# Patient Record
Sex: MALE | Race: WHITE | NOT HISPANIC OR LATINO | Employment: FULL TIME | URBAN - METROPOLITAN AREA
[De-identification: names, ages, dates, MRNs, and addresses within clinical notes are randomized per-mention and may not be internally consistent; named-entity substitution may affect disease eponyms.]

---

## 2023-04-18 LAB
GRAM STAIN: NORMAL
TISSUE/WOUND CULTURE/SMEAR: NORMAL

## 2023-04-27 LAB
THYROTROPIN (MIU/L) IN SER/PLAS BY DETECTION LIMIT <= 0.05 MIU/L: 0.51 MIU/L (ref 0.44–3.98)
THYROXINE (T4) FREE (NG/DL) IN SER/PLAS: 1.04 NG/DL (ref 0.61–1.12)

## 2023-04-28 LAB — TRIIODOTHYRONINE (T3) FREE (PG/ML) IN SER/PLAS: 3.9 PG/ML (ref 2.3–4.2)

## 2023-05-01 LAB — TSH RECEPTOR ANTIBODY: <0.8 IU/L

## 2023-05-02 LAB — THYROID STIMULATING IMMUNOGLOBULIN: <1 TSI INDEX

## 2023-05-10 DIAGNOSIS — I10 HTN (HYPERTENSION), BENIGN: ICD-10-CM

## 2023-05-10 DIAGNOSIS — E78.00 PURE HYPERCHOLESTEROLEMIA: Primary | ICD-10-CM

## 2023-05-10 RX ORDER — AMLODIPINE BESYLATE 5 MG/1
1 TABLET ORAL DAILY
COMMUNITY
Start: 2022-12-05 | End: 2023-05-10 | Stop reason: SDUPTHER

## 2023-05-10 RX ORDER — AMLODIPINE BESYLATE 5 MG/1
5 TABLET ORAL DAILY
Qty: 90 TABLET | Refills: 1 | Status: SHIPPED | OUTPATIENT
Start: 2023-05-10 | End: 2023-05-19 | Stop reason: SDUPTHER

## 2023-05-10 RX ORDER — ATORVASTATIN CALCIUM 20 MG/1
1 TABLET, FILM COATED ORAL NIGHTLY
COMMUNITY
Start: 2023-01-04 | End: 2023-05-10 | Stop reason: SDUPTHER

## 2023-05-10 RX ORDER — ATORVASTATIN CALCIUM 20 MG/1
20 TABLET, FILM COATED ORAL NIGHTLY
Qty: 90 TABLET | Refills: 1 | Status: SHIPPED | OUTPATIENT
Start: 2023-05-10 | End: 2023-05-19 | Stop reason: SDUPTHER

## 2023-05-19 DIAGNOSIS — E78.00 PURE HYPERCHOLESTEROLEMIA: ICD-10-CM

## 2023-05-19 DIAGNOSIS — I10 HTN (HYPERTENSION), BENIGN: ICD-10-CM

## 2023-05-19 RX ORDER — ATORVASTATIN CALCIUM 20 MG/1
20 TABLET, FILM COATED ORAL NIGHTLY
Qty: 90 TABLET | Refills: 1 | Status: SHIPPED | OUTPATIENT
Start: 2023-05-19

## 2023-05-19 RX ORDER — AMLODIPINE BESYLATE 5 MG/1
5 TABLET ORAL DAILY
Qty: 90 TABLET | Refills: 1 | Status: SHIPPED | OUTPATIENT
Start: 2023-05-19 | End: 2023-11-24

## 2023-05-31 ENCOUNTER — HOSPITAL ENCOUNTER (OUTPATIENT)
Dept: DATA CONVERSION | Facility: HOSPITAL | Age: 58
End: 2023-05-31
Attending: OTOLARYNGOLOGY | Admitting: OTOLARYNGOLOGY

## 2023-05-31 DIAGNOSIS — J32.3 CHRONIC SPHENOIDAL SINUSITIS: ICD-10-CM

## 2023-05-31 DIAGNOSIS — J32.2 CHRONIC ETHMOIDAL SINUSITIS: ICD-10-CM

## 2023-05-31 DIAGNOSIS — J34.2 DEVIATED NASAL SEPTUM: ICD-10-CM

## 2023-05-31 DIAGNOSIS — E78.5 HYPERLIPIDEMIA, UNSPECIFIED: ICD-10-CM

## 2023-05-31 DIAGNOSIS — J32.0 CHRONIC MAXILLARY SINUSITIS: ICD-10-CM

## 2023-05-31 DIAGNOSIS — I10 ESSENTIAL (PRIMARY) HYPERTENSION: ICD-10-CM

## 2023-06-02 ENCOUNTER — APPOINTMENT (OUTPATIENT)
Dept: LAB | Facility: LAB | Age: 58
End: 2023-06-02

## 2023-06-02 LAB
THYROTROPIN (MIU/L) IN SER/PLAS BY DETECTION LIMIT <= 0.05 MIU/L: 0.08 MIU/L (ref 0.44–3.98)
THYROXINE (T4) FREE (NG/DL) IN SER/PLAS: 1.59 NG/DL (ref 0.61–1.12)

## 2023-07-12 LAB
COMPLETE PATHOLOGY REPORT: NORMAL
CONVERTED CLINICAL DIAGNOSIS-HISTORY: NORMAL
CONVERTED FINAL DIAGNOSIS: NORMAL
CONVERTED FINAL REPORT PDF LINK TO COPY AND PASTE: NORMAL
CONVERTED GROSS DESCRIPTION: NORMAL

## 2023-09-07 VITALS
TEMPERATURE: 97.2 F | SYSTOLIC BLOOD PRESSURE: 174 MMHG | RESPIRATION RATE: 16 BRPM | HEART RATE: 64 BPM | DIASTOLIC BLOOD PRESSURE: 94 MMHG

## 2023-09-27 LAB
THYROTROPIN (MIU/L) IN SER/PLAS BY DETECTION LIMIT <= 0.05 MIU/L: 0.2 MIU/L (ref 0.44–3.98)
THYROXINE (T4) FREE (NG/DL) IN SER/PLAS: 1.05 NG/DL (ref 0.61–1.12)

## 2023-09-30 NOTE — H&P
History of Present Illness:   History Present Illness:  Reason for surgery: Chronic sinusitis, deviated nasal  septum   HPI:    This patient is a 58-year-old male who presented to outpatient ENT clinic with continued complaints of nasal obstruction, post-nasal drip, chronic cough, found on  exam to have a deviation of his nasal septum. He was trialed on a course of antibiotics and steroids with little subjective improvement and radiographic evidence of persistent sinonasal disease. Given this, decision was made to proceed to the operating  room for bilateral endoscopic sinus surgery, septoplasty. The risks, benefits, and alternatives were extensively discussed with the patient, who ultimately agreed to proceed. There have been no major changes to his medical status since his previous outpatient  visit. No other relevant history at this time.     Allergies:        Allergies:  ·  No Known Allergies :     Home Medication Review:   Home Medications Reviewed: yes     Impression/Procedure:   ·  Impression and Planned Procedure: Bilateral endoscopic sinus surgery  Septoplasty       ERAS (Enhanced Recovery After Surgery):  ·  ERAS Patient: no       Vital Signs:  Temperature C: 36.2 degrees C   Temperature F: 97.1 degrees F   Heart Rate: 64 beats per minute   Respiratory Rate: 16 breath per minute   Blood Pressure Systolic: 174 mm/Hg   Blood Pressure Diastolic: 94 mm/Hg     Physical Exam by System:    Constitutional: Well developed, awake/alert/oriented  x3, no distress, alert and cooperative   Eyes: Sclera nonicteric.  EOMI   Head/Neck: Neck supple, no apparent injury, No JVD,  trachea midline   Respiratory/Thorax: Good chest expansion, thorax  symmetric.  Breathing unlabored   Cardiovascular: No signs of peripheral edema or cyanosis   Extremities: Moving all extremities spontaneously.   No evidence of clubbing or contracture   Neurological: alert and oriented x3, intact senses,  normal strength   Psychological:  Appropriate mood and behavior   Skin: Warm and dry, no lesions, no rashes     Consent:   COVID-19 Consent:  ·  COVID-19 Risk Consent Surgeon has reviewed key risks related to the risk of lakhwinder COVID-19 and if they contract COVID-19 what the risks are.     Attestation:   Note Completion:  I am a:  Resident/Fellow   Attending Attestation I saw and evaluated the patient.  I personally obtained the key and critical portions of the history and physical exam or was physically present for key and  critical portions performed by the resident/fellow. I reviewed the resident/fellow?s documentation and discussed the patient with the resident/fellow.  I agree with the resident/fellow?s medical decision making as documented in the note.     I personally evaluated the patient on 31-May-2023         Electronic Signatures:  Yuri Conley (MD (Resident))  (Signed 31-May-2023 11:10)   Authored: History of Present Illness, Allergies, Home  Medication Review, Impression/Procedure, ERAS, Physical Exam, Consent, Note Completion  Rocky Townsend)  (Signed 07-Jun-2023 08:20)   Authored: Note Completion   Co-Signer: History of Present Illness, Allergies, Home Medication Review, Impression/Procedure, ERAS, Physical Exam, Consent, Note Completion      Last Updated: 07-Jun-2023 08:20 by Rocky Townsend)

## 2023-10-02 NOTE — OP NOTE
PROCEDURE DETAILS    Preoperative Diagnosis:  Chronic sinusitis  Deviated nasal septum  Postoperative Diagnosis:  Same  Surgeon: Rocky Townsend MD  Resident/Fellow/Other Assistant: Yuri Conley MD    Procedure:  1. Endoscopic maxillary antrostomy with removal of tissue, bilateral  2. Endoscopic complete ethmoidectomy, bilateral  3. Endoscopic frontal sinusotomy, bilateral  4. Endoscopic sphenoidotomy, bilateral  5. Endoscopic septoplasty  6. Use of image guidance, extradural  Anesthesia: GET  Estimated Blood Loss: 100  Findings: 1. Broad septal deviation to the right  2. Thick, inspissated secretions in left anterior ethmoid cells  Specimens(s) Collected: yes,  Bilateral sinus contents   Complications: None  Implants: No absorbable or non-absorbable packing used. No splints.   Patient Returned To/Condition: PACU/satisfactory                                Attestation:   Note Completion:  Attending Attestation I was present for key portions of the procedure and the procedure lasted longer than 5 minutes.    I am a: Resident/Fellow         Electronic Signatures:  Yuri Conley (Resident))  (Signed 31-May-2023 15:25)   Authored: Post-Operative Note, Chart Review, Note Completion  Rocky Townsend)  (Signed 07-Jun-2023 08:24)   Authored: Post-Operative Note, Chart Review, Note Completion   Co-Signer: Post-Operative Note, Chart Review, Note Completion      Last Updated: 07-Jun-2023 08:24 by Rocky Townsend)

## 2024-04-19 NOTE — OP NOTE
PREOPERATIVE DIAGNOSIS:  1. Chronic sinusitis.  2. Postnasal drainage.  3. Throat clearing, coughing.  4. Deviated nasal septum.    POSTOPERATIVE DIAGNOSIS:  1. Chronic sinusitis.  2. Postnasal drainage.  3. Throat clearing, coughing.  4. Deviated nasal septum.    OPERATION/PROCEDURE:  1. Bilateral nasal endoscopy with total ethmoidectomy including  sphenoidotomy with tissue removal, CPT 97286-64.   2. Bilateral nasal endoscopy with frontal sinusotomy, CPT 52228-75.  3. Bilateral maxillary endoscopy with tissue removal, CPT 35965-58.  4. Endoscopic septoplasty, CPT 95936.  5. Extracranial CT image guidance, CPT 37339.    SURGEON:  Rocky Townsend MD.    ASSISTANT(S):  Yuri Conley.    ANESTHESIA:  General endotracheal anesthesia.    COMPLICATIONS:  None.    ESTIMATED BLOOD LOSS:  Approximately 100 cc.    SPECIMENS:  1. Sinus contents.  2. Septum.    FINDINGS:  1. Septal deviation to the right, corrected.  2. Trapped secretions, most prominent within left ethmoid cavity.  3. Diffuse inflammatory changes throughout dissected paranasal  sinuses.   4. No permanent packing was placed.    INDICATIONS FOR PROCEDURE:  The patient is a 58-year-old male who I initially met on April 15,  2023, with ongoing symptoms related to his nose and sinuses.  He was  treated with adequate medical therapy with persistence of symptoms,  and imaging was obtained demonstrating significant inflammatory  changes throughout his sinuses.  He and I discussed options, both  medical and surgical, and he was comfortable moving forward with  endonasal surgery.     DESCRIPTION OF PROCEDURE:  After informed consent was obtained and all questions were answered,  the patient was brought to the operating room and placed supine on  the operating room table.  General anesthesia was induced by the  Anesthesia staff, and the patient was orally intubated.  The table  was turned 90 degrees, and Afrin-soaked pledgets were placed within  both the  patient's nasal cavities.     The CT image guidance system was brought into the field.  The CT data  was uploaded, reviewed, and a plan was finalized.  The headset was  attached to the patient.  The image guidance was registered accurate  in 3 separate orientations and was used throughout the surgical  procedure to identify critical landmarks such as the borders of the  orbit as well as the ethmoid skull base.  The patient was then  prepped and draped in a standard fashion for endonasal surgery.     Endoscopic septoplasty was performed.  1% lidocaine with 1:100,000  epinephrine was injected into both sides of the septum as well as the  axilla of each middle turbinate.  After a period of approximately 10  minutes, a Oxford incision was made on the right-hand side sparing  approximately 15 mm of caudal strut.  Submucoperichondrial and  submucoperiosteal planes were developed, and the deviated segments of  cartilage and bone were widely exposed.  The septum was traversed  anteriorly.  In likewise fashion, submucoperichondrial and  submucoperiosteal planes were developed.  The deviated segments of  cartilage and bone were then removed, and the septum became  significantly more midline.  The Tucker incision was closed with 4-0  chromic.     Bilateral maxillary endoscopy with tissue removal was performed.  Bilaterally, each middle turbinate was medialized, and the uncinate  process was identified and resected.  Each antrostomy was opened from  the lacrimal bone anteriorly to the posterior wall of each maxillary  sinus posteriorly, and the natural drainage pathway on each side was  incorporated.  Within each sinus, there was diffuse inflammatory  change that was debrided in all orientations.  Copious irrigation was  applied.     Bilateral nasal endoscopy with total ethmoidectomy including  sphenoidotomy with tissue removal was performed.  Bilaterally,  anterior and posterior ethmoid air cells adjacent to the lamina  and  along the ethmoid skull base were widely removed.  Within the left  ethmoid cavity, there was thick mucoid drainage that was evacuated  with suction and copious irrigation.  At the conclusion of each  ethmoid cavity dissection, the lamina and ethmoid skull base had been  widely delineated.  The anterior nor posterior ethmoid arteries were  encountered.  The basal lamella was traversed, and the superior  turbinate was identified and the inferior 1/3rd was resected.  The  natural drainage pathway of the sphenoid was identified and  cannulated, and each sphenoidotomy was opened widely in all  orientations.  He had a very hypoplastic left sphenoid, and to keep  it patent, I integrated this with the right sphenoid to create 1  large opening.  Edematous mucosa was debrided from each sphenoid.     Bilateral nasal endoscopy with frontal sinusotomy was performed.  With identification of the cranial base posteriorly, I proceeded  anteriorly.  Remnant anterior ethmoid air cells within each frontal  drainage pathway were identified and removed, and each frontal sinus  was cannulated and opened widely in all orientations.  Each frontal  was hypoplastic, but the left was smaller than the right.  At the  conclusion of the frontal sinus dissection, each frontal have been  opened from the axilla of middle turbinate medially to the orbit  laterally and from the anterior to posterior tables.  Edematous  mucosa was debrided from the each frontal drainage pathway.     Hemostasis was deemed excellent.  The patient was turned over to the  Anesthesia staff and extubated in the operating room without  complication.  He was transported to the postanesthesia care unit in  satisfactory condition.     I attest, Dr. Rokcy Townsend was present for all key portions of  the surgical procedure and immediately available for all non-key  portions.  There were no intraoperative complications noted.        Rocky Townsend MD    DD:   06/07/2023 12:22:26 EST  DT:  06/07/2023 13:03:57 EST  DICTATION NUMBER:  [id number]  INTERNAL JOB NUMBER:  [id number]    CC:  Rocky Townsend MD, Fax: [phone]       Electronic Signatures:  Rocky Townsend (MD) (Signed on 10-Sammy-2023 11:08)   Authored   Unsigned, Draft (SYS GENERATED) (Entered on 07-Jun-2023 13:03)   Entered     Last Updated: 10-Sammy-2023 11:08 by Rocky Townsend)

## 2024-06-04 DIAGNOSIS — I10 HTN (HYPERTENSION), BENIGN: ICD-10-CM

## 2024-06-05 RX ORDER — AMLODIPINE BESYLATE 5 MG/1
5 TABLET ORAL DAILY
Qty: 30 TABLET | Refills: 2 | OUTPATIENT
Start: 2024-06-05

## 2024-07-08 DIAGNOSIS — I10 HTN (HYPERTENSION), BENIGN: ICD-10-CM

## 2024-07-10 RX ORDER — AMLODIPINE BESYLATE 5 MG/1
5 TABLET ORAL DAILY
Qty: 30 TABLET | Refills: 0 | OUTPATIENT
Start: 2024-07-10

## 2024-07-11 DIAGNOSIS — I10 HTN (HYPERTENSION), BENIGN: ICD-10-CM

## 2024-07-12 RX ORDER — AMLODIPINE BESYLATE 5 MG/1
5 TABLET ORAL DAILY
Qty: 30 TABLET | Refills: 0 | OUTPATIENT
Start: 2024-07-12

## 2024-10-09 ENCOUNTER — APPOINTMENT (OUTPATIENT)
Dept: PRIMARY CARE | Facility: CLINIC | Age: 59
End: 2024-10-09
Payer: COMMERCIAL

## 2025-01-13 ASSESSMENT — PROMIS GLOBAL HEALTH SCALE
EMOTIONAL_PROBLEMS: NEVER
CARRYOUT_PHYSICAL_ACTIVITIES: COMPLETELY
RATE_AVERAGE_FATIGUE: MILD
RATE_SOCIAL_SATISFACTION: EXCELLENT
RATE_GENERAL_HEALTH: VERY GOOD
RATE_PHYSICAL_HEALTH: VERY GOOD
RATE_AVERAGE_PAIN: 1
CARRYOUT_SOCIAL_ACTIVITIES: EXCELLENT
RATE_MENTAL_HEALTH: EXCELLENT
RATE_QUALITY_OF_LIFE: VERY GOOD

## 2025-01-13 NOTE — PROGRESS NOTES
"Subjective   Patient ID: Miguel Angel Pisano \"Mumtaz\" is a 59 y.o. male who presents for Annual Exam.    Last Annual Physical:January 2023  Last Dental Visit: 2024  Last Eye exam: 8 months ago  Hearing Concerns: No  Diet: well balanced  Exercise Routine: Regularly      HPI     The patient was last seen over 2 years ago. He is not taking any medication at this time. He was previously prescribed Amlodipine for his hypertension and Atorvastatin for hyperlipidemia. His blood today is elevated as he is not taking his medication.     Review of Systems  Constitutional: No fever or chills, No Night Sweats  Eyes: No Blurry Vision or Eye sight problems  ENT: No Nasal Discharge, Hoarseness, sore throat  Cardiovascular: no chest pain, no palpitations and no syncope.   Respiratory: no cough, no shortness of breath during exertion and no shortness of breath at rest.   Gastrointestinal: no abdominal pain, no nausea and no vomiting.   : No issues with urinary stream, burning with urination, no blood in urine or stools  Skin: No Skin rashes or Lesions  Neuro: No Headache, no dizziness or Numbness or tingling  Psych: No Anxiety, depression or sleeping problems  Heme: No Easy bleeding or brusing.     Objective   /90   Pulse 55   Ht 1.803 m (5' 11\")   Wt 78 kg (172 lb)   SpO2 95%   BMI 23.99 kg/m²     Physical Exam  Constitutional: Alert and in no acute distress. Well developed, well nourished.   Head and Face: Head and face: Normal.    Eyes: Normal external exam. Pupils were equal in size, round, reactive to light (PERRL) with normal accommodation and extraocular movements intact (EOMI).   Ears, Nose, Mouth, and Throat: External inspection of ears and nose: Normal.  Hearing: Normal.  Nasal mucosa, septum, and turbinates: Normal.  Lips, teeth, and gums: Normal.  Oropharynx: Normal.   Neck: No neck mass was observed. Supple. Thyroid not enlarged and there were no palpable thyroid nodules.   Cardiovascular: Heart rate and rhythm were " normal, normal S1 and S2. Pedal pulses: Normal. No peripheral edema.   Pulmonary: No respiratory distress. Clear bilateral breath sounds.   Abdomen: Soft nontender; no abdominal mass palpated. Normal bowel sounds. No organomegaly.   : Deferred  Musculoskeletal: No joint swelling seen, normal movements of all extremities. Range of motion: Normal.  Muscle strength/tone: Normal.    Skin: Normal skin color and pigmentation, normal skin turgor, and no rash.   Neurologic: Deep tendon reflexes were 2+ and symmetric.   Psychiatric: Judgment and insight: Intact. Mood and affect: Normal.  Lymphatic: No cervical lymphadenopathy. Palpation of lymph nodes in axillae: Normal.  Palpation of lymph nodes in groin: Normal.    Lab Results   Component Value Date    WBC 9.2 12/19/2022    HGB 14.2 12/19/2022    HCT 44.9 12/19/2022     12/19/2022    CHOL 197 12/19/2022    TRIG 54 12/19/2022    HDL 74.7 12/19/2022    ALT 41 12/19/2022    AST 21 12/19/2022     12/19/2022    K 3.8 12/19/2022     12/19/2022    CREATININE 0.85 12/19/2022    BUN 23 12/19/2022    CO2 33 (H) 12/19/2022    TSH 0.20 (L) 09/27/2023       XR knee  Narrative: Interpreted By:  EFRAIN ALBERTO MD  MRN: [id number]  Patient Name: EFRAIN STEWART     STUDY:  KNEE; 3 VIEWS; KNEE; 1 OR 2 VIEWS     INDICATION:  pain  M25.561: Bilateral knee pain M25.562:.     COMPARISON:  None     ACCESSION NUMBER(S):  [id number]; [id number]     ORDERING CLINICIAN:  ODILON QUEZADA     FINDINGS:  Moderate tricompartmental osteoarthritis left knee worst medially. No  fracture seen. No osseous lesions. Right knee demonstrates no  osseous, articular, or soft tissue abnormality.     Impression: Moderate osteoarthritis left knee worst in the medial compartment.     Normal appearance right knee.  XR knee  Narrative: Interpreted By:  EFRAIN ALBERTO MD  MRN: [id number]  Patient Name: TERRY EFRAIN     STUDY:  KNEE; 3 VIEWS; KNEE; 1 OR 2 VIEWS     INDICATION:  pain  M25.561: Bilateral knee  pain M25.562:.     COMPARISON:  None     ACCESSION NUMBER(S):  [id number]; [id number]     ORDERING CLINICIAN:  ODILON QUEZADA     FINDINGS:  Moderate tricompartmental osteoarthritis left knee worst medially. No  fracture seen. No osseous lesions. Right knee demonstrates no  osseous, articular, or soft tissue abnormality.     Impression: Moderate osteoarthritis left knee worst in the medial compartment.     Normal appearance right knee.      Assessment/Plan   Diagnoses and all orders for this visit:  Annual physical exam  -     CBC; Future  -     Comprehensive Metabolic Panel; Future  -     Hemoglobin A1C; Future  -     Lipid Panel; Future  -     TSH with reflex to Free T4 if abnormal; Future  Benign essential HTN  -     Albumin-Creatinine Ratio, Urine Random; Future  -     amLODIPine (Norvasc) 5 mg tablet; Take 1 tablet (5 mg) by mouth once daily.  -     Follow Up In [facility] - PCP - Established; Future  Agatston CAC score, <100  -     atorvastatin (Lipitor) 20 mg tablet; Take 1 tablet (20 mg) by mouth once daily at bedtime.  Pure hypercholesterolemia  -     Lipoprotein a; Future  -     atorvastatin (Lipitor) 20 mg tablet; Take 1 tablet (20 mg) by mouth once daily at bedtime.  Encounter for immunization  -     Tdap vaccine, age 7 years and older  (BOOSTRIX)  -     Zoster vaccine, recombinant, adult (SHINGRIX)  -     Flu vaccine, trivalent, preservative free, age 6 months and greater (Fluarix/Fluzone/Flulaval)  Vitamin D deficiency  -     Vitamin B12; Future  -     Vitamin D 25-Hydroxy,Total (for eval of Vitamin D levels); Future  Encounter for prostate cancer screening  -     Prostate Specific Antigen, Screen; Future  Non-seasonal allergic rhinitis due to other allergic trigger  -     ipratropium (Atrovent) 21 mcg (0.03 %) nasal spray; Administer 2 sprays into each nostril every 12 hours.        Dear Miguel Angel Pisano     It was my pleasure to take care of you today in the office. Below are the things we  discussed today:     1. Immunizations: Yearly Flu shot is recommended. Get it at the pharmacy         a: COVID:  Get it at the pharmacy         b: Tetanus: Given today         c: Shingrix: Given today.         2. Blood Work: ordered  3. Seen your dentist twice a year  4. Yearly Eye exam is recommended    5. BMI: Normal  6: Diet recommendations:   Eat Clean, Try to have as many home cooked meals as possible  Avoid processed foods which contain excess calories, sugar, and sodium.    7. Exercise recommendations:   150 minutes a week to maintain your weight     If you have to loose weight, you need a better diet and exercise plan.     8. Please get your Living will / Advance directive completed if you do not have one already. Please make sure our office has a copy of the latest one.     9. Colonoscopy: September 2016  10. PSA: ordered    11. Hyperlipidemia: Lipoprotein A . Restart Lipitor.    12. Hyperthyroidism: Ordered labs.     13. Non allergic rhinitis: Start Atrovent nasal spray. Should this not help we recommend following up with Allergy and immunology.     14. Hypertension: Restart amlodipine.     Follow up in one year for a Physical. Please call the office before your Physical to see if you need blood work completed prior to your physical.     Please call me if any questions arise from now until your next visit. I will call you after I am done seeing patients. A Doctor is always available by phone when the office is closed. Please feel free to call for help with any problem that you feel shouldn't wait until the office re-opens.     Scribe Attestation  By signing my name below, I, Annette Mariee MD, Scribe attest that this documentation has been prepared under the direction and in the presence of Annette Mariee MD. All medical record entries made by the Scribe were at my direction or personally dictated by me. I have reviewed the chart and agree that the record accurately reflects my personal performance of  the history, physical exam, discussion and plan.

## 2025-01-15 ENCOUNTER — APPOINTMENT (OUTPATIENT)
Dept: PRIMARY CARE | Facility: CLINIC | Age: 60
End: 2025-01-15
Payer: COMMERCIAL

## 2025-01-15 VITALS
DIASTOLIC BLOOD PRESSURE: 90 MMHG | SYSTOLIC BLOOD PRESSURE: 160 MMHG | OXYGEN SATURATION: 95 % | WEIGHT: 172 LBS | HEIGHT: 71 IN | BODY MASS INDEX: 24.08 KG/M2 | HEART RATE: 55 BPM

## 2025-01-15 DIAGNOSIS — Z23 ENCOUNTER FOR IMMUNIZATION: ICD-10-CM

## 2025-01-15 DIAGNOSIS — E55.9 VITAMIN D DEFICIENCY: ICD-10-CM

## 2025-01-15 DIAGNOSIS — Z12.5 ENCOUNTER FOR PROSTATE CANCER SCREENING: ICD-10-CM

## 2025-01-15 DIAGNOSIS — I10 BENIGN ESSENTIAL HTN: ICD-10-CM

## 2025-01-15 DIAGNOSIS — R93.1 AGATSTON CAC SCORE, <100: ICD-10-CM

## 2025-01-15 DIAGNOSIS — E78.00 PURE HYPERCHOLESTEROLEMIA: ICD-10-CM

## 2025-01-15 DIAGNOSIS — J30.89 NON-SEASONAL ALLERGIC RHINITIS DUE TO OTHER ALLERGIC TRIGGER: ICD-10-CM

## 2025-01-15 DIAGNOSIS — Z00.00 ANNUAL PHYSICAL EXAM: Primary | ICD-10-CM

## 2025-01-15 PROBLEM — J30.9 ALLERGIC RHINITIS: Status: ACTIVE | Noted: 2025-01-15

## 2025-01-15 PROBLEM — E05.90 HYPERTHYROIDISM: Status: ACTIVE | Noted: 2025-01-15

## 2025-01-15 PROBLEM — M25.561 BILATERAL KNEE PAIN: Status: ACTIVE | Noted: 2025-01-15

## 2025-01-15 PROBLEM — M25.562 BILATERAL KNEE PAIN: Status: ACTIVE | Noted: 2025-01-15

## 2025-01-15 PROBLEM — J32.9 CHRONIC SINUS INFECTION: Status: ACTIVE | Noted: 2025-01-15

## 2025-01-15 PROCEDURE — 90715 TDAP VACCINE 7 YRS/> IM: CPT | Performed by: FAMILY MEDICINE

## 2025-01-15 PROCEDURE — 3008F BODY MASS INDEX DOCD: CPT | Performed by: FAMILY MEDICINE

## 2025-01-15 PROCEDURE — 3077F SYST BP >= 140 MM HG: CPT | Performed by: FAMILY MEDICINE

## 2025-01-15 PROCEDURE — 99396 PREV VISIT EST AGE 40-64: CPT | Performed by: FAMILY MEDICINE

## 2025-01-15 PROCEDURE — 3080F DIAST BP >= 90 MM HG: CPT | Performed by: FAMILY MEDICINE

## 2025-01-15 PROCEDURE — 1036F TOBACCO NON-USER: CPT | Performed by: FAMILY MEDICINE

## 2025-01-15 PROCEDURE — 90656 IIV3 VACC NO PRSV 0.5 ML IM: CPT | Performed by: FAMILY MEDICINE

## 2025-01-15 PROCEDURE — 90750 HZV VACC RECOMBINANT IM: CPT | Performed by: FAMILY MEDICINE

## 2025-01-15 PROCEDURE — 90471 IMMUNIZATION ADMIN: CPT | Performed by: FAMILY MEDICINE

## 2025-01-15 PROCEDURE — 90472 IMMUNIZATION ADMIN EACH ADD: CPT | Performed by: FAMILY MEDICINE

## 2025-01-15 RX ORDER — ATORVASTATIN CALCIUM 20 MG/1
20 TABLET, FILM COATED ORAL NIGHTLY
Qty: 90 TABLET | Refills: 0 | Status: SHIPPED | OUTPATIENT
Start: 2025-01-15

## 2025-01-15 RX ORDER — IPRATROPIUM BROMIDE 21 UG/1
2 SPRAY, METERED NASAL EVERY 12 HOURS
Qty: 30 ML | Refills: 12 | Status: SHIPPED | OUTPATIENT
Start: 2025-01-15

## 2025-01-15 RX ORDER — AMLODIPINE BESYLATE 5 MG/1
5 TABLET ORAL DAILY
Qty: 90 TABLET | Refills: 0 | Status: SHIPPED | OUTPATIENT
Start: 2025-01-15

## 2025-01-15 ASSESSMENT — ENCOUNTER SYMPTOMS
LOSS OF SENSATION IN FEET: 0
OCCASIONAL FEELINGS OF UNSTEADINESS: 0
DEPRESSION: 0

## 2025-01-15 ASSESSMENT — COLUMBIA-SUICIDE SEVERITY RATING SCALE - C-SSRS
2. HAVE YOU ACTUALLY HAD ANY THOUGHTS OF KILLING YOURSELF?: NO
1. IN THE PAST MONTH, HAVE YOU WISHED YOU WERE DEAD OR WISHED YOU COULD GO TO SLEEP AND NOT WAKE UP?: NO

## 2025-01-15 ASSESSMENT — PATIENT HEALTH QUESTIONNAIRE - PHQ9
SUM OF ALL RESPONSES TO PHQ9 QUESTIONS 1 AND 2: 0
2. FEELING DOWN, DEPRESSED OR HOPELESS: NOT AT ALL
1. LITTLE INTEREST OR PLEASURE IN DOING THINGS: NOT AT ALL

## 2025-02-10 DIAGNOSIS — J30.89 NON-SEASONAL ALLERGIC RHINITIS DUE TO OTHER ALLERGIC TRIGGER: ICD-10-CM

## 2025-02-10 RX ORDER — IPRATROPIUM BROMIDE 21 UG/1
2 SPRAY, METERED NASAL EVERY 12 HOURS
Qty: 90 ML | Refills: 3 | Status: SHIPPED | OUTPATIENT
Start: 2025-02-10

## 2025-03-31 ASSESSMENT — ENCOUNTER SYMPTOMS
FEVER: 0
SWEATS: 0
RHINORRHEA: 0
CHILLS: 0
MYALGIAS: 0
HEMOPTYSIS: 0
COUGH: 1
HEARTBURN: 0
WEIGHT LOSS: 0
HEADACHES: 0
SHORTNESS OF BREATH: 0
WHEEZING: 0
SORE THROAT: 0

## 2025-04-07 ENCOUNTER — APPOINTMENT (OUTPATIENT)
Dept: PRIMARY CARE | Facility: CLINIC | Age: 60
End: 2025-04-07
Payer: COMMERCIAL

## 2025-04-07 VITALS
BODY MASS INDEX: 24.1 KG/M2 | HEART RATE: 67 BPM | OXYGEN SATURATION: 97 % | DIASTOLIC BLOOD PRESSURE: 70 MMHG | WEIGHT: 172.8 LBS | SYSTOLIC BLOOD PRESSURE: 130 MMHG

## 2025-04-07 DIAGNOSIS — Z23 ENCOUNTER FOR IMMUNIZATION: ICD-10-CM

## 2025-04-07 DIAGNOSIS — G89.29 CHRONIC PAIN OF RIGHT KNEE: Primary | ICD-10-CM

## 2025-04-07 DIAGNOSIS — E78.00 PURE HYPERCHOLESTEROLEMIA: ICD-10-CM

## 2025-04-07 DIAGNOSIS — I10 BENIGN ESSENTIAL HTN: ICD-10-CM

## 2025-04-07 DIAGNOSIS — M25.561 CHRONIC PAIN OF RIGHT KNEE: Primary | ICD-10-CM

## 2025-04-07 DIAGNOSIS — R93.1 AGATSTON CAC SCORE, <100: ICD-10-CM

## 2025-04-07 PROCEDURE — 3075F SYST BP GE 130 - 139MM HG: CPT | Performed by: FAMILY MEDICINE

## 2025-04-07 PROCEDURE — 3078F DIAST BP <80 MM HG: CPT | Performed by: FAMILY MEDICINE

## 2025-04-07 PROCEDURE — 90471 IMMUNIZATION ADMIN: CPT | Performed by: FAMILY MEDICINE

## 2025-04-07 PROCEDURE — 99214 OFFICE O/P EST MOD 30 MIN: CPT | Performed by: FAMILY MEDICINE

## 2025-04-07 PROCEDURE — 90750 HZV VACC RECOMBINANT IM: CPT | Performed by: FAMILY MEDICINE

## 2025-04-07 RX ORDER — AMLODIPINE BESYLATE 5 MG/1
5 TABLET ORAL DAILY
Qty: 90 TABLET | Refills: 0 | Status: SHIPPED | OUTPATIENT
Start: 2025-04-07

## 2025-04-07 RX ORDER — ATORVASTATIN CALCIUM 20 MG/1
20 TABLET, FILM COATED ORAL NIGHTLY
Qty: 90 TABLET | Refills: 0 | Status: SHIPPED | OUTPATIENT
Start: 2025-04-07

## 2025-04-07 ASSESSMENT — ENCOUNTER SYMPTOMS
HEMOPTYSIS: 0
MYALGIAS: 0
COUGH: 1
FEVER: 0
HEADACHES: 0
CHILLS: 0
HEARTBURN: 0
WEIGHT LOSS: 0
SHORTNESS OF BREATH: 0
RHINORRHEA: 0
SWEATS: 0
SORE THROAT: 0
WHEEZING: 0

## 2025-04-07 NOTE — ASSESSMENT & PLAN NOTE
Continue atorvastatin 20 mg.   Orders:    atorvastatin (Lipitor) 20 mg tablet; Take 1 tablet (20 mg) by mouth once daily at bedtime.

## 2025-04-07 NOTE — PROGRESS NOTES
"Subjective   Patient ID: Miguel Angel Pisano \"Mumtaz\" is a 60 y.o. male who presents for Follow-up (2 mo).    Cough  This is a chronic problem. The current episode started more than 1 year ago. The problem has been unchanged. The problem occurs every few hours. The cough is Productive of sputum. Associated symptoms include postnasal drip. Pertinent negatives include no chest pain, chills, ear congestion, ear pain, fever, headaches, heartburn, hemoptysis, myalgias, nasal congestion, rash, rhinorrhea, sore throat, shortness of breath, sweats, weight loss or wheezing. Nothing aggravates the symptoms.        The patient is not taking amlodipine for his hypertension. Blood pressure today is okay in the clinic today.  He is taking atorvastatin for hyperlipidemia.     He has right knee pain and swelling which is intermittent. Knee is warm to the touch.    Last x-ray done 2 years ago demonstrated moderate arthritis. He thinks ibuprofen has helped a little.       Review of Systems  Constitutional: No fever or chills  Cardiovascular: no chest pain, no palpitations and no syncope.   Respiratory: no cough, no shortness of breath during exertion and no shortness of breath at rest.   Gastrointestinal: no abdominal pain, no nausea and no vomiting.  Neuro: No Headache, no dizziness  MSK: + right knee pain and swelling     Objective   /70 (BP Location: Right arm, Patient Position: Sitting)   Pulse 67   Wt 78.4 kg (172 lb 12.8 oz)   SpO2 97%   BMI 24.10 kg/m²     Physical Exam  Constitutional: Alert and in no acute distress. Well developed, well nourished  Head and Face: Head and face: Normal.    Cardiovascular: Heart rate and rhythm were normal, normal S1 and S2. No peripheral edema.   Pulmonary: No respiratory distress. Clear bilateral breath sounds.  Musculoskeletal: Gait and station: Normal. Muscle strength/tone: Normal.   Skin: Normal skin color and pigmentation, normal skin turgor, and no rash.    Psychiatric: Judgment and " insight: Intact. Mood and affect: Normal.  MSK: Right knee is warm to the touch and swollen.      Lab Results   Component Value Date    WBC 9.2 12/19/2022    HGB 14.2 12/19/2022    HCT 44.9 12/19/2022     12/19/2022    CHOL 197 12/19/2022    TRIG 54 12/19/2022    HDL 74.7 12/19/2022    ALT 41 12/19/2022    AST 21 12/19/2022     12/19/2022    K 3.8 12/19/2022     12/19/2022    CREATININE 0.85 12/19/2022    BUN 23 12/19/2022    CO2 33 (H) 12/19/2022    TSH 0.20 (L) 09/27/2023       XR knee  Narrative: Interpreted By:  EFRAIN ALBERTO MD  MRN: [id number]  Patient Name: EFRAIN STEWART     STUDY:  KNEE; 3 VIEWS; KNEE; 1 OR 2 VIEWS     INDICATION:  pain  M25.561: Bilateral knee pain M25.562:.     COMPARISON:  None     ACCESSION NUMBER(S):  [id number]; [id number]     ORDERING CLINICIAN:  ODILON QUEZADA     FINDINGS:  Moderate tricompartmental osteoarthritis left knee worst medially. No  fracture seen. No osseous lesions. Right knee demonstrates no  osseous, articular, or soft tissue abnormality.     Impression: Moderate osteoarthritis left knee worst in the medial compartment.     Normal appearance right knee.  XR knee  Narrative: Interpreted By:  EFRAIN ALBERTO MD  MRN: [id number]  Patient Name: EFRAIN STEWART     STUDY:  KNEE; 3 VIEWS; KNEE; 1 OR 2 VIEWS     INDICATION:  pain  M25.561: Bilateral knee pain M25.562:.     COMPARISON:  None     ACCESSION NUMBER(S):  [id number]; [id number]     ORDERING CLINICIAN:  ODILON QUEZADA     FINDINGS:  Moderate tricompartmental osteoarthritis left knee worst medially. No  fracture seen. No osseous lesions. Right knee demonstrates no  osseous, articular, or soft tissue abnormality.     Impression: Moderate osteoarthritis left knee worst in the medial compartment.     Normal appearance right knee.      Assessment/Plan   Assessment & Plan  Benign essential HTN  Please take amlodipine 5 mg.   Orders:    Follow Up In [facility] - PCP - Established    amLODIPine (Norvasc)  5 mg tablet; Take 1 tablet (5 mg) by mouth once daily.    Agatston CAC score, <100  Continue atorvastatin 20 mg.   Orders:    atorvastatin (Lipitor) 20 mg tablet; Take 1 tablet (20 mg) by mouth once daily at bedtime.    Pure hypercholesterolemia    Orders:    atorvastatin (Lipitor) 20 mg tablet; Take 1 tablet (20 mg) by mouth once daily at bedtime.    Chronic pain of right knee  Ordered blood work.  Orders:    Uric Acid; Future    C-Reactive Protein; Future    Sedimentation Rate; Future              Follow up in 6 months.      Please call me if any questions arise from now until your next visit. I will call you after I am done seeing patients. A Doctor is always available by phone when the office is closed. Please feel free to call for help with any problem that you feel shouldn't wait until the office re-opens.     Scribe Attestation  By signing my name below, ICinthya, Scrthomas   attest that this documentation has been prepared under the direction and in the presence of Annette Mariee MD.

## 2025-04-07 NOTE — ASSESSMENT & PLAN NOTE
Please take amlodipine 5 mg.   Orders:    Follow Up In [facility] - PCP - Established    amLODIPine (Norvasc) 5 mg tablet; Take 1 tablet (5 mg) by mouth once daily.

## 2025-04-08 LAB
25(OH)D3+25(OH)D2 SERPL-MCNC: 32 NG/ML (ref 30–100)
ALBUMIN SERPL-MCNC: 4.7 G/DL (ref 3.6–5.1)
ALBUMIN/CREAT UR: 53 MG/G CREAT
ALP SERPL-CCNC: 85 U/L (ref 35–144)
ALT SERPL-CCNC: 33 U/L (ref 9–46)
ANION GAP SERPL CALCULATED.4IONS-SCNC: 10 MMOL/L (CALC) (ref 7–17)
AST SERPL-CCNC: 32 U/L (ref 10–35)
BILIRUB SERPL-MCNC: 0.9 MG/DL (ref 0.2–1.2)
BUN SERPL-MCNC: 29 MG/DL (ref 7–25)
CALCIUM SERPL-MCNC: 9.7 MG/DL (ref 8.6–10.3)
CHLORIDE SERPL-SCNC: 102 MMOL/L (ref 98–110)
CO2 SERPL-SCNC: 30 MMOL/L (ref 20–32)
CREAT SERPL-MCNC: 0.91 MG/DL (ref 0.7–1.35)
CREAT UR-MCNC: 212 MG/DL (ref 20–320)
CRP SERPL-MCNC: 4.3 MG/L
EGFRCR SERPLBLD CKD-EPI 2021: 95 ML/MIN/1.73M2
ERYTHROCYTE [DISTWIDTH] IN BLOOD BY AUTOMATED COUNT: 12.1 % (ref 11–15)
ERYTHROCYTE [SEDIMENTATION RATE] IN BLOOD BY WESTERGREN METHOD: 9 MM/H
EST. AVERAGE GLUCOSE BLD GHB EST-MCNC: 105 MG/DL
EST. AVERAGE GLUCOSE BLD GHB EST-SCNC: 5.8 MMOL/L
GLUCOSE SERPL-MCNC: 106 MG/DL (ref 65–139)
HBA1C MFR BLD: 5.3 % OF TOTAL HGB
HCT VFR BLD AUTO: 45.3 % (ref 38.5–50)
HGB BLD-MCNC: 14.8 G/DL (ref 13.2–17.1)
MCH RBC QN AUTO: 29.2 PG (ref 27–33)
MCHC RBC AUTO-ENTMCNC: 32.7 G/DL (ref 32–36)
MCV RBC AUTO: 89.5 FL (ref 80–100)
MICROALBUMIN UR-MCNC: 11.2 MG/DL
PLATELET # BLD AUTO: 279 THOUSAND/UL (ref 140–400)
PMV BLD REES-ECKER: 11.4 FL (ref 7.5–12.5)
POTASSIUM SERPL-SCNC: 4.2 MMOL/L (ref 3.5–5.3)
PROT SERPL-MCNC: 7 G/DL (ref 6.1–8.1)
PSA SERPL-MCNC: 0.41 NG/ML
RBC # BLD AUTO: 5.06 MILLION/UL (ref 4.2–5.8)
SODIUM SERPL-SCNC: 142 MMOL/L (ref 135–146)
T4 FREE SERPL-MCNC: 1.4 NG/DL (ref 0.8–1.8)
TSH SERPL-ACNC: 0.23 MIU/L (ref 0.4–4.5)
URATE SERPL-MCNC: 6.2 MG/DL (ref 4–8)
VIT B12 SERPL-MCNC: 580 PG/ML (ref 200–1100)
WBC # BLD AUTO: 6.1 THOUSAND/UL (ref 3.8–10.8)

## 2025-06-27 ENCOUNTER — TELEPHONE (OUTPATIENT)
Dept: PRIMARY CARE | Facility: CLINIC | Age: 60
End: 2025-06-27

## 2025-08-08 DIAGNOSIS — I10 BENIGN ESSENTIAL HTN: ICD-10-CM

## 2025-08-09 RX ORDER — AMLODIPINE BESYLATE 5 MG/1
5 TABLET ORAL DAILY
Qty: 30 TABLET | Refills: 1 | Status: SHIPPED | OUTPATIENT
Start: 2025-08-09